# Patient Record
Sex: FEMALE | Race: OTHER | HISPANIC OR LATINO | Employment: FULL TIME | URBAN - METROPOLITAN AREA
[De-identification: names, ages, dates, MRNs, and addresses within clinical notes are randomized per-mention and may not be internally consistent; named-entity substitution may affect disease eponyms.]

---

## 2019-09-05 ENCOUNTER — TRANSCRIBE ORDERS (OUTPATIENT)
Dept: MAMMOGRAPHY | Facility: HOSPITAL | Age: 39
End: 2019-09-05

## 2019-09-05 DIAGNOSIS — Z12.31 VISIT FOR SCREENING MAMMOGRAM: Primary | ICD-10-CM

## 2019-09-20 ENCOUNTER — HOSPITAL ENCOUNTER (OUTPATIENT)
Dept: MAMMOGRAPHY | Facility: HOSPITAL | Age: 39
Discharge: HOME | End: 2019-09-20
Admitting: OBSTETRICS & GYNECOLOGY

## 2019-09-20 DIAGNOSIS — Z12.31 VISIT FOR SCREENING MAMMOGRAM: ICD-10-CM

## 2019-09-20 PROCEDURE — 77067 SCR MAMMO BI INCL CAD: CPT

## 2019-09-20 PROCEDURE — 77063 BREAST TOMOSYNTHESIS BI: CPT

## 2019-09-20 PROCEDURE — 77063 BREAST TOMOSYNTHESIS BI: CPT | Performed by: RADIOLOGY

## 2019-09-20 PROCEDURE — 77067 SCR MAMMO BI INCL CAD: CPT | Performed by: RADIOLOGY

## 2020-06-30 ENCOUNTER — TRANSCRIBE ORDERS (OUTPATIENT)
Dept: ADMINISTRATIVE | Facility: HOSPITAL | Age: 40
End: 2020-06-30

## 2020-06-30 DIAGNOSIS — Z12.31 ENCOUNTER FOR SCREENING MAMMOGRAM FOR MALIGNANT NEOPLASM OF BREAST: Primary | ICD-10-CM

## 2020-10-15 ENCOUNTER — HOSPITAL ENCOUNTER (OUTPATIENT)
Dept: MAMMOGRAPHY | Facility: HOSPITAL | Age: 40
Discharge: HOME | End: 2020-10-15
Admitting: OBSTETRICS & GYNECOLOGY

## 2020-10-15 DIAGNOSIS — Z12.31 ENCOUNTER FOR SCREENING MAMMOGRAM FOR MALIGNANT NEOPLASM OF BREAST: ICD-10-CM

## 2020-10-15 PROCEDURE — 77067 SCR MAMMO BI INCL CAD: CPT

## 2020-10-15 PROCEDURE — 77063 BREAST TOMOSYNTHESIS BI: CPT

## 2020-10-15 PROCEDURE — 77063 BREAST TOMOSYNTHESIS BI: CPT | Performed by: RADIOLOGY

## 2020-10-15 PROCEDURE — 77067 SCR MAMMO BI INCL CAD: CPT | Performed by: RADIOLOGY

## 2021-06-03 ENCOUNTER — TRANSCRIBE ORDERS (OUTPATIENT)
Dept: ADMINISTRATIVE | Facility: HOSPITAL | Age: 41
End: 2021-06-03

## 2021-06-03 DIAGNOSIS — Z12.31 VISIT FOR SCREENING MAMMOGRAM: Primary | ICD-10-CM

## 2021-10-18 ENCOUNTER — OFFICE VISIT (OUTPATIENT)
Dept: OBSTETRICS AND GYNECOLOGY | Facility: CLINIC | Age: 41
End: 2021-10-18

## 2021-10-18 ENCOUNTER — HOSPITAL ENCOUNTER (OUTPATIENT)
Dept: MAMMOGRAPHY | Facility: HOSPITAL | Age: 41
Discharge: HOME | End: 2021-10-18
Admitting: OBSTETRICS & GYNECOLOGY

## 2021-10-18 VITALS
DIASTOLIC BLOOD PRESSURE: 80 MMHG | SYSTOLIC BLOOD PRESSURE: 136 MMHG | BODY MASS INDEX: 19.46 KG/M2 | HEIGHT: 67 IN | WEIGHT: 124 LBS

## 2021-10-18 DIAGNOSIS — Z01.419 WOMEN'S ANNUAL ROUTINE GYNECOLOGICAL EXAMINATION: Primary | ICD-10-CM

## 2021-10-18 DIAGNOSIS — Z12.31 BREAST CANCER SCREENING BY MAMMOGRAM: ICD-10-CM

## 2021-10-18 DIAGNOSIS — Z12.31 VISIT FOR SCREENING MAMMOGRAM: ICD-10-CM

## 2021-10-18 PROCEDURE — 99396 PREV VISIT EST AGE 40-64: CPT | Performed by: OBSTETRICS & GYNECOLOGY

## 2021-10-18 PROCEDURE — 77063 BREAST TOMOSYNTHESIS BI: CPT

## 2021-10-18 PROCEDURE — 77067 SCR MAMMO BI INCL CAD: CPT

## 2021-10-18 PROCEDURE — 77063 BREAST TOMOSYNTHESIS BI: CPT | Performed by: RADIOLOGY

## 2021-10-18 PROCEDURE — 77067 SCR MAMMO BI INCL CAD: CPT | Performed by: RADIOLOGY

## 2021-10-18 NOTE — PROGRESS NOTES
GYN Annual Exam     CC - Here for annual exam.        [facility]  Jillian King is a 41 y.o. female, , who presents for annual well woman exam. Patient's last menstrual period was 10/09/2021..  Periods are regular every 25-35 days, lasting 5 days.  Dysmenorrhea:none.  Patient reports problems with: none. There were no changes to her medical or surgical history since her last visit. Partner Status: Marital Status: .  New Partners since last visit: no.  Desires STD Screening: no.    Additional OB/GYN History   Current contraception: contraceptive methods: OCP (estrogen/progesterone) called Marvelon which is given to her by a  In [place] and her mom ships her medicine to her.  Desires to: continue contraception  Last Pap :   Last Completed Pap Smear          Ordered - PAP SMEAR (Every 3 Years) Ordered on 10/18/2021    2019  Done - negative              History of abnormal Pap smear: no  Family history of uterine, colon, breast, or ovarian cancer: no  Performs monthly Self-Breast Exam: yes  Last mammogram: today. Done at .    Last Completed Mammogram     This patient has no relevant Health Maintenance data.         Exercises Regularly: yes  Feelings of Anxiety or Depression: no  Tobacco Usage?: No   OB History        1    Para   1    Term   1            AB        Living   1       SAB        IAB        Ectopic        Molar        Multiple        Live Births                    Health Maintenance   Topic Date Due   • Annual Gynecologic Pelvic and Breast Exam  Never done   • ANNUAL PHYSICAL  Never done   • TDAP/TD VACCINES (1 - Tdap) Never done   • HEPATITIS C SCREENING  Never done   • INFLUENZA VACCINE  Never done   • PAP SMEAR  2022   • COVID-19 Vaccine  Completed   • Pneumococcal Vaccine 0-64  Aged Out       The additional following portions of the patient's history were reviewed and updated as appropriate: allergies, current medications, past family history, past medical  "history, past social history, past surgical history and problem list.    Review of Systems   Constitutional: Negative.    HENT: Negative.    Eyes: Negative.    Respiratory: Negative.    Cardiovascular: Negative.    Gastrointestinal: Negative.    Endocrine: Negative.    Genitourinary: Negative.    Musculoskeletal: Negative.    Skin: Negative.    Allergic/Immunologic: Negative.    Neurological: Negative.    Hematological: Negative.    Psychiatric/Behavioral: Negative.          I have reviewed and agree with the HPI, ROS, and historical information as entered above. Avis Arias MD    Objective   /80   Ht 170.2 cm (67\")   Wt 56.2 kg (124 lb)   LMP 10/09/2021   BMI 19.42 kg/m²     Physical Exam  Vitals and nursing note reviewed. Exam conducted with a chaperone present.   Constitutional:       Appearance: She is well-developed.   HENT:      Head: Normocephalic and atraumatic.   Neck:      Thyroid: No thyroid mass or thyromegaly.   Cardiovascular:      Rate and Rhythm: Normal rate and regular rhythm.      Heart sounds: No murmur heard.      Pulmonary:      Effort: Pulmonary effort is normal. No retractions.      Breath sounds: Normal breath sounds. No wheezing, rhonchi or rales.   Chest:      Chest wall: No mass or tenderness.   Breasts:      Right: Normal. No mass, nipple discharge, skin change or tenderness.      Left: Normal. No mass, nipple discharge, skin change or tenderness.       Abdominal:      General: Bowel sounds are normal.      Palpations: Abdomen is soft. Abdomen is not rigid. There is no mass.      Tenderness: There is no abdominal tenderness. There is no guarding.      Hernia: No hernia is present. There is no hernia in the left inguinal area or right inguinal area.   Genitourinary:     General: Normal vulva.      Exam position: Lithotomy position.      Pubic Area: No rash.       Labia:         Right: No rash, tenderness or lesion.         Left: No rash, tenderness or lesion.       " Urethra: No urethral pain or urethral swelling.      Vagina: Normal. No vaginal discharge or lesions.      Cervix: No cervical motion tenderness, discharge, lesion or cervical bleeding.      Uterus: Normal. Not enlarged, not fixed and not tender.       Adnexa:         Right: No mass, tenderness or fullness.          Left: No mass, tenderness or fullness.        Rectum: No external hemorrhoid.   Musculoskeletal:      Cervical back: Normal range of motion. No muscular tenderness.   Neurological:      Mental Status: She is alert and oriented to person, place, and time.   Psychiatric:         Behavior: Behavior normal.            Assessment and Plan    Problem List Items Addressed This Visit     None      Visit Diagnoses     Women's annual routine gynecological examination    -  Primary    Relevant Orders    Pap IG, HPV-hr    Breast cancer screening by mammogram        Relevant Orders    Mammo Screening Digital Tomosynthesis Bilateral With CAD          1. GYN annual well woman exam.   2. OCP's/Vaginal Ring - Discussed side effects of nausea, BTB, headaches, breast tenderness and slight weight gain in the first three cycles.  Understands risks of blood clots, stroke, and theoretical risk of breast cancer.  Denies family history of blood clots.  3. Reviewed risks/benefits of hormonal contraception after age 35, including possible increased risk of breast cancer, heart disease, blood clots and strokes.  Patient strongly desires to stay on hormonal contraception.  4. Ordered Mammogram today  5. Recommended use of Vitamin D replacement and getting adequate calcium in her diet. (1500mg)  6. Reviewed exercise as a preventative health measures.   7. Reccommended Flu Vaccine in Fall of each year.  8. Symptoms of menopausal transition reviewed with patient.   9. RTC in 1 year or PRN with problems.  10. Return in about 1 year (around 10/18/2022) for Annual physical.     Avis Arias MD  10/18/2021

## 2021-10-27 DIAGNOSIS — Z01.419 WOMEN'S ANNUAL ROUTINE GYNECOLOGICAL EXAMINATION: ICD-10-CM

## 2021-10-29 ENCOUNTER — HOSPITAL ENCOUNTER (OUTPATIENT)
Dept: MAMMOGRAPHY | Facility: HOSPITAL | Age: 41
Discharge: HOME | End: 2021-10-29
Admitting: RADIOLOGY

## 2021-10-29 DIAGNOSIS — R92.8 ABNORMAL MAMMOGRAM: ICD-10-CM

## 2021-10-29 PROCEDURE — 77065 DX MAMMO INCL CAD UNI: CPT

## 2021-10-29 PROCEDURE — G0279 TOMOSYNTHESIS, MAMMO: HCPCS

## 2021-10-29 PROCEDURE — 77061 BREAST TOMOSYNTHESIS UNI: CPT | Performed by: RADIOLOGY

## 2021-10-29 PROCEDURE — 77065 DX MAMMO INCL CAD UNI: CPT | Performed by: RADIOLOGY

## 2022-09-08 ENCOUNTER — TRANSCRIBE ORDERS (OUTPATIENT)
Dept: ADMINISTRATIVE | Facility: HOSPITAL | Age: 42
End: 2022-09-08

## 2022-09-08 DIAGNOSIS — Z12.31 VISIT FOR SCREENING MAMMOGRAM: Primary | ICD-10-CM

## 2022-10-21 ENCOUNTER — APPOINTMENT (OUTPATIENT)
Dept: MAMMOGRAPHY | Facility: HOSPITAL | Age: 42
End: 2022-10-21

## 2022-11-04 ENCOUNTER — HOSPITAL ENCOUNTER (OUTPATIENT)
Dept: MAMMOGRAPHY | Facility: HOSPITAL | Age: 42
Discharge: HOME | End: 2022-11-04
Admitting: OBSTETRICS & GYNECOLOGY

## 2022-11-04 DIAGNOSIS — Z12.31 VISIT FOR SCREENING MAMMOGRAM: ICD-10-CM

## 2022-11-04 PROCEDURE — 77063 BREAST TOMOSYNTHESIS BI: CPT | Performed by: RADIOLOGY

## 2022-11-04 PROCEDURE — 77063 BREAST TOMOSYNTHESIS BI: CPT

## 2022-11-04 PROCEDURE — 77067 SCR MAMMO BI INCL CAD: CPT

## 2022-11-04 PROCEDURE — 77067 SCR MAMMO BI INCL CAD: CPT | Performed by: RADIOLOGY

## 2023-08-17 ENCOUNTER — TRANSCRIBE ORDERS (OUTPATIENT)
Dept: ADMINISTRATIVE | Facility: HOSPITAL | Age: 43
End: 2023-08-17
Payer: COMMERCIAL

## 2023-08-17 DIAGNOSIS — Z12.31 VISIT FOR SCREENING MAMMOGRAM: Primary | ICD-10-CM

## 2023-11-06 ENCOUNTER — HOSPITAL ENCOUNTER (OUTPATIENT)
Dept: MAMMOGRAPHY | Facility: HOSPITAL | Age: 43
Discharge: HOME | End: 2023-11-06
Admitting: OBSTETRICS & GYNECOLOGY
Payer: COMMERCIAL

## 2023-11-06 DIAGNOSIS — Z12.31 VISIT FOR SCREENING MAMMOGRAM: ICD-10-CM

## 2023-11-06 PROCEDURE — 77067 SCR MAMMO BI INCL CAD: CPT

## 2023-11-06 PROCEDURE — 77063 BREAST TOMOSYNTHESIS BI: CPT

## 2023-11-08 PROCEDURE — 77063 BREAST TOMOSYNTHESIS BI: CPT | Performed by: RADIOLOGY

## 2023-11-08 PROCEDURE — 77067 SCR MAMMO BI INCL CAD: CPT | Performed by: RADIOLOGY

## 2024-05-30 ENCOUNTER — TRANSCRIBE ORDERS (OUTPATIENT)
Dept: ADMINISTRATIVE | Facility: HOSPITAL | Age: 44
End: 2024-05-30
Payer: COMMERCIAL

## 2024-05-30 DIAGNOSIS — Z12.31 SCREENING MAMMOGRAM FOR BREAST CANCER: Primary | ICD-10-CM

## 2024-08-02 ENCOUNTER — OFFICE VISIT (OUTPATIENT)
Dept: OBSTETRICS AND GYNECOLOGY | Facility: CLINIC | Age: 44
End: 2024-08-02
Payer: COMMERCIAL

## 2024-08-02 VITALS
SYSTOLIC BLOOD PRESSURE: 122 MMHG | HEIGHT: 67 IN | WEIGHT: 115.4 LBS | BODY MASS INDEX: 18.11 KG/M2 | DIASTOLIC BLOOD PRESSURE: 80 MMHG

## 2024-08-02 DIAGNOSIS — Z01.419 ENCOUNTER FOR ANNUAL ROUTINE GYNECOLOGICAL EXAMINATION: ICD-10-CM

## 2024-08-02 DIAGNOSIS — Z12.11 COLON CANCER SCREENING: ICD-10-CM

## 2024-08-02 DIAGNOSIS — Z12.31 BREAST CANCER SCREENING BY MAMMOGRAM: ICD-10-CM

## 2024-08-02 DIAGNOSIS — Z30.41 ENCOUNTER FOR SURVEILLANCE OF CONTRACEPTIVE PILLS: Primary | ICD-10-CM

## 2024-08-02 RX ORDER — DEXTROAMPHETAMINE SACCHARATE, AMPHETAMINE ASPARTATE MONOHYDRATE, DEXTROAMPHETAMINE SULFATE AND AMPHETAMINE SULFATE 3.75; 3.75; 3.75; 3.75 MG/1; MG/1; MG/1; MG/1
1 CAPSULE, EXTENDED RELEASE ORAL DAILY
COMMUNITY
Start: 2024-07-29

## 2024-08-02 RX ORDER — DEXTROAMPHETAMINE SACCHARATE, AMPHETAMINE ASPARTATE, DEXTROAMPHETAMINE SULFATE AND AMPHETAMINE SULFATE 1.25; 1.25; 1.25; 1.25 MG/1; MG/1; MG/1; MG/1
TABLET ORAL
COMMUNITY
Start: 2024-07-29

## 2024-08-02 NOTE — PROGRESS NOTES
Gynecologic Annual Exam Note          GYN Annual Exam     Gynecologic Exam        Subjective     HPI  Jillian King is a 44 y.o. female, , who presents for annual well woman exam as a established patient. There were no changes to her medical or surgical history since her last visit..  Patient's last menstrual period was 2024.  Her periods occur every 28-30 days, lasting 5 days.  The flow is moderate. She denies dysmenorrhea. Marital Status: . She is sexually active. She has not had new partners.. STD testing recommendations have been explained to the patient and she declines STD testing.    The patient would like to discuss the following complaints today: none    Additional OB/GYN History   contraceptive methods: OCP (estrogen/progesterone) called Samuel which is given to her by a  In [place] and her mom ships her medicine to her.   Desires to: continue contraception  History of migraines: no    Last Pap : 10/18/2021. Result: negative. HPV: negative.   Last Completed Pap Smear            Ordered - PAP SMEAR (Every 3 Years) Ordered on 2024      10/18/2021  SCANNED - PAP SMEAR    2019  Done - negative                  History of abnormal Pap smear: no  Family history of uterine, colon, breast, or ovarian cancer: no  Performs monthly Self-Breast Exam: yes  Last mammogram: 23. Done at . There is a copy in the chart.    Last Completed Mammogram            Scheduled - MAMMOGRAM (Every 2 Years) Scheduled for 2023  Mammo Screening Digital Tomosynthesis Bilateral With CAD    2022  Mammo Screening Digital Tomosynthesis Bilateral With CAD    10/29/2021  Mammo Diagnostic Digital Tomosynthesis Left With CAD    10/18/2021  Mammo Screening Digital Tomosynthesis Bilateral With CAD    10/15/2020  Mammo Screening Digital Tomosynthesis Bilateral With CAD    Only the first 5 history entries have been loaded, but more history exists.                     Colonoscopy: has never had a colonoscopy or cologuard. First is scheduled in October.  Exercises Regularly: yes  Feelings of Anxiety or Depression: no  Tobacco Usage?: No       Current Outpatient Medications:     amphetamine-dextroamphetamine (ADDERALL) 5 MG tablet, TAKE 1 TABLET BY MOUTH EVERY DAY AROUND LUNCHTIME AS NEEDED, Disp: , Rfl:     amphetamine-dextroamphetamine XR (ADDERALL XR) 15 MG 24 hr capsule, Take 1 capsule by mouth Daily, Disp: , Rfl:     NON FORMULARY, Marvelon; her mother sends them to her from [place], Disp: , Rfl:      Patient denies the need for medication refills today.    OB History          1    Para   1    Term   1            AB        Living   1         SAB        IAB        Ectopic        Molar        Multiple        Live Births                    Past Medical History:   Diagnosis Date    Breast cyst         Past Surgical History:   Procedure Laterality Date    BREAST CYST EXCISION Right        Health Maintenance   Topic Date Due    BMI FOLLOWUP  Never done    COLORECTAL CANCER SCREENING  Never done    TDAP/TD VACCINES (1 - Tdap) Never done    HEPATITIS C SCREENING  Never done    ANNUAL PHYSICAL  Never done    Annual Gynecologic Pelvic and Breast Exam  10/19/2022    COVID-19 Vaccine (2023- season) 2023    INFLUENZA VACCINE  2024    PAP SMEAR  10/18/2024    MAMMOGRAM  2025    Pneumococcal Vaccine 0-64  Aged Out       The additional following portions of the patient's history were reviewed and updated as appropriate: allergies, current medications, past family history, past medical history, past social history, past surgical history, and problem list.    Review of Systems   Constitutional: Negative.    HENT: Negative.     Eyes: Negative.    Respiratory: Negative.     Cardiovascular: Negative.    Gastrointestinal: Negative.    Endocrine: Negative.    Genitourinary: Negative.    Musculoskeletal: Negative.    Skin: Negative.    Allergic/Immunologic:  "Negative.    Neurological: Negative.    Hematological: Negative.    Psychiatric/Behavioral: Negative.           I have reviewed and agree with the HPI, ROS, and historical information as entered above. Rhonda Schwarz, APRN          Objective   /80   Ht 170.2 cm (67\")   Wt 52.3 kg (115 lb 6.4 oz)   LMP 07/28/2024   BMI 18.07 kg/m²     Physical Exam  Vitals and nursing note reviewed. Exam conducted with a chaperone present.   Constitutional:       General: She is not in acute distress.     Appearance: Normal appearance. She is well-developed. She is not ill-appearing, toxic-appearing or diaphoretic.   HENT:      Head: Normocephalic and atraumatic.   Neck:      Thyroid: No thyroid mass or thyromegaly.   Cardiovascular:      Rate and Rhythm: Normal rate.      Heart sounds: No murmur heard.  Pulmonary:      Effort: Pulmonary effort is normal. No respiratory distress or retractions.   Chest:      Chest wall: No mass.   Breasts:     Right: Normal. No mass, nipple discharge, skin change or tenderness.      Left: Normal. No mass, nipple discharge, skin change or tenderness.   Abdominal:      General: There is no distension.      Palpations: Abdomen is soft. Abdomen is not rigid. There is no mass.      Tenderness: There is no abdominal tenderness. There is no guarding or rebound.      Hernia: No hernia is present.   Genitourinary:     General: Normal vulva.      Exam position: Lithotomy position.      Labia:         Right: No rash, tenderness or lesion.         Left: No rash, tenderness or lesion.       Vagina: Normal. No vaginal discharge or lesions.      Cervix: Normal. No cervical motion tenderness, discharge, lesion or cervical bleeding.      Uterus: Normal. Not enlarged, not fixed and not tender.       Adnexa: Right adnexa normal and left adnexa normal.        Right: No mass or tenderness.          Left: No mass or tenderness.        Rectum: Normal. No external hemorrhoid.   Musculoskeletal:      Cervical back: " Normal range of motion. No muscular tenderness.   Skin:     General: Skin is warm and dry.   Neurological:      Mental Status: She is alert and oriented to person, place, and time.   Psychiatric:         Mood and Affect: Mood normal.         Behavior: Behavior normal. Behavior is cooperative.         Thought Content: Thought content normal.         Judgment: Judgment normal.            Assessment and Plan    Problem List Items Addressed This Visit    None  Visit Diagnoses       Encounter for surveillance of contraceptive pills    -  Primary    Encounter for annual routine gynecological examination        Relevant Orders    LIQUID-BASED PAP SMEAR WITH HPV GENOTYPING REGARDLESS OF INTERPRETATION ([facility])    Colon cancer screening        Relevant Orders    Cologuard - Stool, Per Rectum    Breast cancer screening by mammogram        Relevant Orders    Mammo Screening Digital Tomosynthesis Bilateral With CAD            GYN annual well woman exam.   Pap guidelines reviewed. Pap today.  Patient has [place] doctor send her GENNA  Reviewed risks/benefits of hormonal contraception after age 35, including possible increased risk of breast cancer, heart disease, blood clots and strokes.  Patient strongly desires to stay on hormonal contraception.  Reviewed monthly self breast exams.  Instructed to call with lumps, pain, or breast discharge.    Ordered Mammogram today  Reviewed BMI and weight loss as preventative health measures.   Reviewed exercise as a preventative health measures.   RTC in 1 year or PRN with problems.  Discussed importance of routine colon cancer screening. Reviewed current guidelines. Recommended colonoscopy after age 45.  Will order cologuard for patient to complete if she is interested.    Rhonda Schwarz, APRN  08/02/2024

## 2024-08-09 LAB — REF LAB TEST METHOD: NORMAL

## 2024-10-24 LAB
NCCN CRITERIA FLAG: NORMAL
TYRER CUZICK SCORE: 13.1

## 2024-11-08 ENCOUNTER — HOSPITAL ENCOUNTER (OUTPATIENT)
Dept: MAMMOGRAPHY | Facility: HOSPITAL | Age: 44
Discharge: HOME | End: 2024-11-08
Admitting: OBSTETRICS & GYNECOLOGY
Payer: COMMERCIAL

## 2024-11-08 DIAGNOSIS — Z12.31 SCREENING MAMMOGRAM FOR BREAST CANCER: ICD-10-CM

## 2024-11-08 PROCEDURE — 77063 BREAST TOMOSYNTHESIS BI: CPT

## 2024-11-08 PROCEDURE — 77067 SCR MAMMO BI INCL CAD: CPT

## 2025-07-01 ENCOUNTER — TRANSCRIBE ORDERS (OUTPATIENT)
Dept: ADMINISTRATIVE | Facility: HOSPITAL | Age: 45
End: 2025-07-01
Payer: COMMERCIAL

## 2025-07-01 DIAGNOSIS — Z12.31 VISIT FOR SCREENING MAMMOGRAM: Primary | ICD-10-CM
